# Patient Record
Sex: FEMALE | Race: WHITE | NOT HISPANIC OR LATINO | URBAN - METROPOLITAN AREA
[De-identification: names, ages, dates, MRNs, and addresses within clinical notes are randomized per-mention and may not be internally consistent; named-entity substitution may affect disease eponyms.]

---

## 2020-04-16 NOTE — PATIENT DISCUSSION
Possible need for long term prophylactic care with oral medications to lessen likelihood of future flare ups.

## 2021-02-05 ENCOUNTER — IMPORTED ENCOUNTER (OUTPATIENT)
Dept: URBAN - METROPOLITAN AREA CLINIC 50 | Facility: CLINIC | Age: 56
End: 2021-02-05

## 2021-02-08 ENCOUNTER — IMPORTED ENCOUNTER (OUTPATIENT)
Dept: URBAN - METROPOLITAN AREA CLINIC 50 | Facility: CLINIC | Age: 56
End: 2021-02-08

## 2021-04-17 ASSESSMENT — TONOMETRY
OD_IOP_MMHG: 10
OS_IOP_MMHG: 10

## 2021-04-17 ASSESSMENT — VISUAL ACUITY
OD_SC: 20/40
OS_SC: 20/30-1

## 2022-03-28 NOTE — PATIENT DISCUSSION
Cataract surgery has been performed in the first eye and activities of daily living are still impaired. The patient would like to proceed with cataract surgery in the second eye as scheduled. The patient elects Basic OD, goal of -2.00.

## 2022-03-30 NOTE — PATIENT DISCUSSION
Prism in Rx ou. Partial Purse String (Simple) Text: Given the location of the defect and the characteristics of the surrounding skin a simple purse string closure was deemed most appropriate.  Undermining was performed circumfirentially around the surgical defect.  A purse string suture was then placed and tightened. Wound tension only allowed a partial closure of the circular defect.

## 2024-07-17 NOTE — PATIENT DISCUSSION
Possible need for long term prophylactic care with oral medications to lessen likelihood of future flare ups. 4-10 METS